# Patient Record
Sex: MALE
[De-identification: names, ages, dates, MRNs, and addresses within clinical notes are randomized per-mention and may not be internally consistent; named-entity substitution may affect disease eponyms.]

---

## 2018-05-20 ENCOUNTER — HOSPITAL ENCOUNTER (EMERGENCY)
Dept: HOSPITAL 42 - ED | Age: 53
Discharge: HOME | End: 2018-05-20
Payer: MEDICARE

## 2018-05-20 VITALS — HEART RATE: 63 BPM | SYSTOLIC BLOOD PRESSURE: 106 MMHG | DIASTOLIC BLOOD PRESSURE: 62 MMHG

## 2018-05-20 VITALS — OXYGEN SATURATION: 98 %

## 2018-05-20 VITALS — TEMPERATURE: 98 F | RESPIRATION RATE: 18 BRPM

## 2018-05-20 VITALS — BODY MASS INDEX: 25.8 KG/M2

## 2018-05-20 DIAGNOSIS — E86.0: ICD-10-CM

## 2018-05-20 DIAGNOSIS — R55: Primary | ICD-10-CM

## 2018-05-20 LAB
ALBUMIN SERPL-MCNC: 4.1 G/DL (ref 3–4.8)
ALBUMIN/GLOB SERPL: 1.4 {RATIO} (ref 1.1–1.8)
ALT SERPL-CCNC: 24 U/L (ref 7–56)
APPEARANCE UR: CLEAR
APTT BLD: 25.5 SECONDS (ref 25.1–36.5)
AST SERPL-CCNC: 27 U/L (ref 17–59)
BACTERIA #/AREA URNS HPF: (no result) /[HPF]
BASE EXCESS BLDV CALC-SCNC: -0.6 MMOL/L (ref 0–2)
BASE EXCESS BLDV CALC-SCNC: -1.9 MMOL/L (ref 0–2)
BASOPHILS # BLD AUTO: 0.02 K/MM3 (ref 0–2)
BASOPHILS NFR BLD: 0.8 % (ref 0–3)
BILIRUB UR-MCNC: NEGATIVE MG/DL
BNP SERPL-MCNC: 50.5 PG/ML (ref 0–450)
BUN SERPL-MCNC: 14 MG/DL (ref 7–21)
CALCIUM SERPL-MCNC: 9 MG/DL (ref 8.4–10.5)
COLOR UR: YELLOW
EOSINOPHIL # BLD: 0.1 10*3/UL (ref 0–0.7)
EOSINOPHIL NFR BLD: 2.4 % (ref 1.5–5)
EPI CELLS #/AREA URNS HPF: (no result) /HPF (ref 0–5)
ERYTHROCYTE [DISTWIDTH] IN BLOOD BY AUTOMATED COUNT: 12.2 % (ref 11.5–14.5)
GFR NON-AFRICAN AMERICAN: > 60
GLUCOSE UR STRIP-MCNC: NEGATIVE MG/DL
GRANULOCYTES # BLD: 1.19 10*3/UL (ref 1.4–6.5)
GRANULOCYTES NFR BLD: 47.4 % (ref 50–68)
HGB BLD-MCNC: 14.1 G/DL (ref 14–18)
INR PPP: 1.02 (ref 0.93–1.08)
LEUKOCYTE ESTERASE UR-ACNC: NEGATIVE LEU/UL
LYMPHOCYTES # BLD: 1 10*3/UL (ref 1.2–3.4)
LYMPHOCYTES NFR BLD AUTO: 41 % (ref 22–35)
MCH RBC QN AUTO: 31.5 PG (ref 25–35)
MCHC RBC AUTO-ENTMCNC: 34.6 G/DL (ref 31–37)
MCV RBC AUTO: 91.1 FL (ref 80–105)
MONOCYTES # BLD AUTO: 0.2 10*3/UL (ref 0.1–0.6)
MONOCYTES NFR BLD: 8.4 % (ref 1–6)
PH BLDV: 7.29 [PH] (ref 7.32–7.43)
PH BLDV: 7.35 [PH] (ref 7.32–7.43)
PH UR STRIP: 6.5 [PH] (ref 4.7–8)
PLATELET # BLD: 165 10^3/UL (ref 120–450)
PMV BLD AUTO: 9.5 FL (ref 7–11)
PROT UR STRIP-MCNC: NEGATIVE MG/DL
PROTHROMBIN TIME: 11.7 SECONDS (ref 9.4–12.5)
RBC # BLD AUTO: 4.47 10^6/UL (ref 3.5–6.1)
RBC # UR STRIP: (no result) /UL
SP GR UR STRIP: 1.01 (ref 1–1.03)
TROPONIN I SERPL-MCNC: < 0.01 NG/ML
UROBILINOGEN UR STRIP-ACNC: 0.2 E.U./DL
VENOUS BLOOD FIO2: 21 %
VENOUS BLOOD FIO2: 21 %
VENOUS BLOOD GAS PCO2: 46 (ref 40–60)
VENOUS BLOOD GAS PCO2: 53 (ref 40–60)
VENOUS BLOOD GAS PO2: 42 MM/HG (ref 30–55)
VENOUS BLOOD GAS PO2: 80 MM/HG (ref 30–55)
WBC # BLD AUTO: 2.5 10^3/UL (ref 4.5–11)
WBC #/AREA URNS HPF: (no result) /HPF (ref 0–6)

## 2018-05-20 PROCEDURE — 83880 ASSAY OF NATRIURETIC PEPTIDE: CPT

## 2018-05-20 PROCEDURE — 85610 PROTHROMBIN TIME: CPT

## 2018-05-20 PROCEDURE — 84484 ASSAY OF TROPONIN QUANT: CPT

## 2018-05-20 PROCEDURE — 83615 LACTATE (LD) (LDH) ENZYME: CPT

## 2018-05-20 PROCEDURE — 71045 X-RAY EXAM CHEST 1 VIEW: CPT

## 2018-05-20 PROCEDURE — 80053 COMPREHEN METABOLIC PANEL: CPT

## 2018-05-20 PROCEDURE — 83735 ASSAY OF MAGNESIUM: CPT

## 2018-05-20 PROCEDURE — 96361 HYDRATE IV INFUSION ADD-ON: CPT

## 2018-05-20 PROCEDURE — 81001 URINALYSIS AUTO W/SCOPE: CPT

## 2018-05-20 PROCEDURE — 82550 ASSAY OF CK (CPK): CPT

## 2018-05-20 PROCEDURE — 85025 COMPLETE CBC W/AUTO DIFF WBC: CPT

## 2018-05-20 PROCEDURE — 99285 EMERGENCY DEPT VISIT HI MDM: CPT

## 2018-05-20 PROCEDURE — 93005 ELECTROCARDIOGRAM TRACING: CPT

## 2018-05-20 PROCEDURE — 85730 THROMBOPLASTIN TIME PARTIAL: CPT

## 2018-05-20 PROCEDURE — 82803 BLOOD GASES ANY COMBINATION: CPT

## 2018-05-20 PROCEDURE — 96360 HYDRATION IV INFUSION INIT: CPT

## 2018-05-20 NOTE — CARD
--------------- APPROVED REPORT --------------





EKG Measurement

Heart [id number]

[id number]

[id number]

[id number]

[id number]



<Conclusion>

Normal sinus rhythm

Normal ECG

## 2018-05-20 NOTE — ED PDOC
Arrival/HPI





- General


Chief Complaint: Dizziness/Lightheaded


Time Seen by Provider: 05/20/18 10:16


Historian: Patient





- History of Present Illness


Narrative History of Present Illness (Text): 





05/20/18 10:17





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 

DPH, presents to the Emergency department complaining of near-syncopal episode 

prior to arrival. Patient informs drinking coffee and frosted flakes in the 

morning and went swimming in [employer] [place] right after. After swimming and 

coming out from the water, patient clutched his head and felt dizzy with the 

feeling of passing out. Patient called 911 immediately and presented to the ER 

for medical evaluation. patient denies any headache, loss of consciousness, 

chest pain, shortness of breath, nausea, vomiting, diarrhea, abdominal pain or 

any other complaints. 


Time/Duration: Prior to Arrival


Symptom Onset: Sudden


Symptom Course: Improving


Activities at Onset: Other (Swimming)


Context: Other ([place])





Past Medical History





- Provider Review


Nursing Documentation Reviewed: Yes





- Infectious Disease


Hx of Infectious Diseases: None





- Cardiac


Hx Cardiac Disorders: No


Hx Angina: No





- Pulmonary


Hx Respiratory Disorders: No





- Neurological


Other/Comment: speech impairment





- HEENT


Hx HEENT Disorder: No





- Renal


Hx Renal Disorder: No





- Endocrine/Metabolic


Hx Endocrine Disorders: No





- Hematological/Oncological


Hx Blood Disorders: No


Hx AIDS: No





- Integumentary


Hx Dermatological Disorder: No





- Musculoskeletal/Rheumatological


Hx Musculoskeletal Disorders: No





- Gastrointestinal


Hx Gastrointestinal Disorders: No





- Genitourinary/Gynecological


Hx Genitourinary Disorders: Yes ("frequent urination")


Hx Prostate Problems: Yes





- Psychiatric


Hx Psychophysiologic Disorder: No


Hx Substance Use: No





Family/Social History





- Physician Review


Nursing Documentation Reviewed: Yes


Family/Social History: No Known Family HX


Smoking Status: Never Smoked


Hx Alcohol Use: No


Hx Substance Use: No





Allergies/Home Meds


Allergies/Adverse Reactions: 


Allergies





No Known Allergies Allergy (Verified 02/22/16 07:51)


 








Home Medications: 


 Home Meds











 Medication  Instructions  Recorded  Confirmed


 


No Known Home Med  05/20/18 05/20/18














Review of Systems





- Physician Review


All systems were reviewed & negative as marked: Yes





- Review of Systems


Constitutional: Normal.  absent: Fevers


Respiratory: Normal.  absent: SOB


Cardiovascular: Syncope (near-syncope).  absent: Chest Pain


Gastrointestinal: Normal.  absent: Abdominal Pain, Diarrhea, Nausea, Vomiting


Neurological: Dizziness.  absent: Headache





Physical Exam


Vital Signs Reviewed: Yes


Vital Signs











  Temp Pulse Resp BP Pulse Ox


 


 05/20/18 14:33   63  18  106/62  100


 


 05/20/18 12:53   67  18  121/65  100


 


 05/20/18 10:12  98 F  86  18  101/78  96











Temperature: Afebrile


Blood Pressure: Normal


Pulse: Regular


Respiratory Rate: Normal


Appearance: Positive for: Well-Appearing, Non-Toxic, Comfortable


Pain Distress: None


Mental Status: Positive for: Alert and Oriented X 3


Finger Stick Blood Glucose: 104





- Systems Exam


Head: Present: Atraumatic, Normocephalic


Pupils: Present: PERRL


Extroacular Muscles: Present: EOMI


Conjunctiva: Present: Normal


Respiratory/Chest: Present: Clear to Auscultation, Good Air Exchange.  No: 

Respiratory Distress, Accessory Muscle Use


Cardiovascular: Present: Regular Rate and Rhythm, Normal S1, S2, Other (

Orthostatic drop between lying down and standing up, 107 systolic lying down to 

80 systolic standing up.).  No: Murmurs


Abdomen: No: Tenderness, Distention, Peritoneal Signs


Upper Extremity: Present: Normal Inspection.  No: Cyanosis, Edema


Lower Extremity: Present: Normal Inspection.  No: Edema


Neurological: Present: GCS=15, CN II-XII Intact, Speech Normal


Skin: Present: Warm, Dry, Normal Color.  No: Rashes


Psychiatric: Present: Alert





Medical Decision Making


ED Course and Treatment: 





05/20/18 10:17


Impression: 53 year old male presents to the Emergency department for near-

syncope prior to arrival.





Differential Diagnosis included but are not limited to: occult ischemia vs. 

dehydration





Plan:


-- VBG


-- EKG


-- Labs


-- Chest X-ray


-- IV fluids


-- Urinalysis


-- Reassess and disposition





Progress Notes:





05/20/18 10:17


EKG: Ordered, reviewed, and independently interpreted the EKG.


Rate : 84 BPM


Rhythm : NSR


Interpretation : No ischemic ST-segment elevations or depressions, no T-wave 

inversions, normal intervals. No arrhythmogenic changes.





05/20/18 10:40


Chest X-ray reviewed by radiologist, shows: 


FINDINGS:


LUNGS:


No active pulmonary disease.


PLEURA:


No significant pleural effusion identified, no pneumothorax apparent.


CARDIOVASCULAR:


Normal.


OSSEOUS STRUCTURES:


No significant abnormalities.


VISUALIZED UPPER ABDOMEN:


Normal.


OTHER FINDINGS:


None.


IMPRESSION:


No active disease.





05/20/18 14:35


VBG S/P 3 LITERS OF SALINE  LACTATE FROM <6 TO NOW 1.8. pT FEELS BETTER AND NOW 

NO LONGER ORTHOSTATIC CLINICALLY OR BY VS . hAS BEEN DIET/LIFESTYLE COUNSELED 

REGARDING INCREASED HYDRATION ESEPCIALLY BEFORE MORNINING SWIMMIMIG WORKOUTS  











- Lab Interpretations


Lab Results: 








 05/20/18 10:36 





 05/20/18 10:36 





 Lab Results





05/20/18 14:03: pO2 80 H, VBG pH 7.29 L, VBG pCO2 53.0, VBG HCO3 25.5, VBG 

Total CO2 27.1, VBG O2 Sat (Calc) 97.6 H, VBG Base Excess -1.9 L, VBG Potassium 

4.3, Sodium 138.0, Chloride 110.0 H, Glucose 126 H, Lactate 1.8, FiO2 21.0, 

Venous Blood Potassium 4.3


05/20/18 12:55: Urine Color Yellow, Urine Appearance Clear, Urine pH 6.5, Ur 

Specific Gravity 1.010, Urine Protein Negative, Urine Glucose (UA) Negative, 

Urine Ketones Negative, Urine Blood Trace-intact H, Urine Nitrate Negative, 

Urine Bilirubin Negative, Urine Urobilinogen 0.2, Ur Leukocyte Esterase Negative

, Urine RBC 1 - 3, Urine WBC 0 - 2, Ur Epithelial Cells None, Urine Bacteria Few


05/20/18 10:36: Sodium 142, Chloride 104, Potassium 4.2, Carbon Dioxide 22, 

Anion Gap 20, BUN 14, Creatinine 1.0, Est GFR (African Amer) > 60, Est GFR (Non-

Af Amer) > 60, Random Glucose 108, Calcium 9.0, Magnesium 2.2, Total Bilirubin 

0.3, AST 27, ALT 24, Alkaline Phosphatase 57, Lactate Dehydrogenase 372, Total 

Creatine Kinase 97, Troponin I < 0.01, NT-Pro-B Natriuret Pep 50.5, Total 

Protein 7.1, Albumin 4.1, Globulin 2.9, Albumin/Globulin Ratio 1.4


05/20/18 10:36: pO2 42, VBG pH 7.35, VBG pCO2 46.0, VBG HCO3 25.4, VBG Total 

CO2 26.8, VBG O2 Sat (Calc) 82.6 H, VBG Base Excess -0.6 L, VBG Potassium 4.1, 

Sodium 137.0, Chloride 103.0, Glucose 112 H, Lactate 6.1 H*, FiO2 21.0, Venous 

Blood Potassium 4.1


05/20/18 10:36: PT 11.7, INR 1.02, APTT 25.5


05/20/18 10:36: WBC 2.5 L*, RBC 4.47, Hgb 14.1, Hct 40.7 L, MCV 91.1, MCH 31.5, 

MCHC 34.6, RDW 12.2, Plt Count 165, MPV 9.5, Gran % 47.4 L, Lymph % (Auto) 41.0 

H, Mono % (Auto) 8.4 H, Eos % (Auto) 2.4, Baso % (Auto) 0.8, Gran # 1.19 L, 

Lymph # (Auto) 1.0 L, Mono # (Auto) 0.2, Eos # (Auto) 0.1, Baso # (Auto) 0.02











- RAD Interpretation


Radiology Orders: 








05/20/18 10:17


CHEST PORTABLE [RAD] Stat 











: Radiologist





- Medication Orders


Current Medication Orders: 











Discontinued Medications





Sodium Chloride (Sodium Chloride 0.9%)  1,000 mls @ 999 mls/hr IV .Q1H1M STA


   Stop: 05/20/18 11:18


   Last Admin: 05/20/18 10:37  Dose: 999 mls/hr





eMAR Start Stop


 Document     05/20/18 10:37  GMD  (Rec: 05/20/18 10:37  GMD  [id number])


     Intravenous Solution


      Start Date                                 05/20/18


      Start Time                                 10:37


      End Date                                   05/20/18


      End time                                   11:38


      Total Infusion Time                        61





Sodium Chloride (Sodium Chloride 0.9%)  2,000 mls @ 999 mls/hr IV .Q2H1M STA


   Stop: 05/20/18 13:19


   Last Admin: 05/20/18 11:25  Dose: 999 mls/hr





eMAR Start Stop


 Document     05/20/18 11:25  GMD  (Rec: 05/20/18 11:25  GMD  [id number])


     Intravenous Solution


      Start Date                                 05/20/18


      Start Time                                 11:25


      End Date                                   05/20/18


      End time                                   13:26


      Total Infusion Time                        121














- Scribe Statement


The provider has reviewed the documentation as recorded by the Scribe


Jose Guadalupe Mendoza.





All medical record entries made by the Scribe were at my direction and 

personally dictated by me. I have reviewed the chart and agree that the record 

accurately reflects my personal performance of the history, physical exam, 

medical decision making, and the department course for this patient. I have 

also personally directed, reviewed, and agree with the discharge instructions 

and disposition.





Disposition/Present on Arrival





- Present on Arrival


Any Indicators Present on Arrival: No


History of DVT/PE: No


History of Uncontrolled Diabetes: No


Urinary Catheter: No


History of Decub. Ulcer: No


History Surgical Site Infection Following: None





- Disposition


Have Diagnosis and Disposition been Completed?: Yes


Diagnosis: 


 Dehydration, Near syncope





Disposition: HOME/ ROUTINE


Disposition Time: 14:38


Patient Plan: Discharge


Condition: IMPROVED


Discharge Instructions (ExitCare):  Dehydration, Adult (DC), Near Fainting (DC)


Print Language: ENGLISH


Additional Instructions: 


dRINK 2.5 TO 3 l DAILY . INCREASED HYDRATION FROM THIS BASELINE RECOMMENDED 

ESPECIALLY BEFORE WORKING OUT. 


Referrals: 


Kevin Carranza MD [Primary Care Provider] - Follow up with primary


Forms:  [facility] (English)

## 2018-05-20 NOTE — RAD
HISTORY:

routine medex am  



COMPARISON:

No prior. 



FINDINGS:



LUNGS:

No active pulmonary disease.



PLEURA:

No significant pleural effusion identified, no pneumothorax apparent.



CARDIOVASCULAR:

Normal.



OSSEOUS STRUCTURES:

No significant abnormalities.



VISUALIZED UPPER ABDOMEN:

Normal.



OTHER FINDINGS:

None.



IMPRESSION:

No active disease.

## 2018-11-07 ENCOUNTER — HOSPITAL ENCOUNTER (OUTPATIENT)
Dept: HOSPITAL 42 - SDS | Age: 53
Discharge: HOME | End: 2018-11-07
Attending: ORTHOPAEDIC SURGERY
Payer: MEDICARE

## 2018-11-07 VITALS
RESPIRATION RATE: 16 BRPM | HEART RATE: 79 BPM | DIASTOLIC BLOOD PRESSURE: 59 MMHG | SYSTOLIC BLOOD PRESSURE: 99 MMHG | OXYGEN SATURATION: 96 %

## 2018-11-07 VITALS — TEMPERATURE: 98.1 F

## 2018-11-07 VITALS — BODY MASS INDEX: 26.2 KG/M2

## 2018-11-07 DIAGNOSIS — S46.211A: Primary | ICD-10-CM

## 2018-11-07 NOTE — PCM.[id number]
Surgeon's Initial Post Op Note





- Surgeon's Notes


Surgeon: LORENZO Groves MD


Assistant:  KRISTINA Beyer PA-C


Type of Anesthesia: General Endo


Anesthesia Administered By: Dr. Knox


Pre-Operative Diagnosis: right distal biceps tendon rupture


Operative Findings: see full note


Post-Operative Diagnosis: same


Operation Performed: right open distal biceps tendon repair


Specimen/Specimens Removed: none


Estimated Blood Loss: EBL {In ML}: 5


Blood Products Given: N/A


Drains Used: No Drains


Post-Op Condition: Fair


Date of Surgery/Procedure: 11/07/18


Time of Surgery/Procedure: 10:23

## 2018-11-07 NOTE — OP
PROCEDURE DATE:  11/07/2018



PREOPERATIVE DIAGNOSIS:  Right distal biceps tendon rupture.



POSTOPERATIVE DIAGNOSIS:  Right distal biceps tendon rupture.



PROCEDURE:  Open repair of right distal biceps tendon rupture.



SURGEON:  David Groves MD.



ASSISTANT:  Dr. Groves was assisted by Darcy Martinez, the physician

assistant.  Ms. Martinez was scrubbed and present throughout the entire case

and assisted in the patient positioning, retraction and wound closure.



ANESTHESIA:  General.



COMPLICATIONS:  None.



ESTIMATED BLOOD LOSS:  5 mL.



INDICATIONS FOR PROCEDURE:  This is a 53-year-old gentleman, who

approximately 5 weeks ago sustained a right elbow injury.  On clinical

examination, the patient was noted to have a positive hook sign, loss of

strength with forearm supination and elbow flexion.  MRI examination was

consistent with a full-thickness tear of his distal biceps tendon. 

Recommendations were for open repair.  The risks and benefits throughout

the procedure were discussed with the patient and the patient's family

including the possibility of rerupture, nerve damage and informed consent

was obtained.



OPERATIVE PROCEDURE:  After surgical site was signed and verified in the

preoperative holding area, the patient was taken to the operating room and

placed supine on the operating room table.  After administration of general

anesthesia, the patient received 2 g of Ancef IV.  Tourniquet was placed

about the right arm.  Care was taken to make sure all bony prominences and

nerves were well padded and protected and the right upper extremity was

prepped and draped in the usual sterile fashion.  Using the C-arm image

intensifier, greater tuberosity was visualized and approximately 5 cm

longitudinal incision was made over the proximal radius.  Care was taken to

carefully retract the lateral antebrachial cutaneous nerve and soft tissue

was dissected bluntly.  The biceps tendon was appeared to be partially

healed into the soft tissues and the snuff of the tendon was carefully

dissected out.  At this point, the bulbous end of the tendon was debrided

and using an Arthrex FiberLoop, a running locked suture was placed around

the distal aspect of the tendon.  An Arthrex button was then loaded onto

the FiberWire suture.  Prior to loading the button, the tendon was sized

and the button was then placed through the sutures and our attention was

directed to the wound.  With the arm in supination, the soft tissue was

dissected bluntly down to the bicipital tuberosity and this was confirmed

using the C-arm.  At this point, the appropriate-size drill was then

drilled bicortically and the drill bit was left in place and the position

of the drill bit was confirmed using the image intensifier.  Next, the

appropriate size reamer was then drilled unicortically and at this point,

the button was loaded onto the  and inserted into the hole

deploying the button.  The sutures were then tensioned bringing the tendon

into the socket.  Satisfied, at this point, the one end of the suture was

then loaded on a 7 mm _____ and this was inserted as a secondary fixation. 

Sutures were tied.  At this point, the arm was taken through a range of

motion, was noted to have full extension, flexion without any difficulty. 

The wound was then copiously irrigated and closed in a layered fashion.  A

sterile dressing was applied and the tourniquet was deflated.  A posterior

splint was placed.  The patient was taken from the procedure, taken to the

recovery room in stable condition.





__________________________________________

David Groves MD



DD:  11/07/2018 10:10:46

DT:  11/07/2018 10:42:20

Job # [id number]

## 2018-11-07 NOTE — RAD
Date of service: 



11/07/2018



PROCEDURE:  Fluoroscopy up to 1 hr



HISTORY:

RT BICEP REPAIR



COMPARISON:





TECHNIQUE:

14.7 sec of fluoro time.  0.53 mGy.  Two images submitted



FINDINGS:

A small orthopedic device is seen in the region of the radial 

tuberosity



IMPRESSION:

As above